# Patient Record
Sex: MALE | HISPANIC OR LATINO | URBAN - METROPOLITAN AREA
[De-identification: names, ages, dates, MRNs, and addresses within clinical notes are randomized per-mention and may not be internally consistent; named-entity substitution may affect disease eponyms.]

---

## 2019-03-28 ENCOUNTER — NEW PATIENT (OUTPATIENT)
Dept: URBAN - METROPOLITAN AREA CLINIC 24 | Facility: CLINIC | Age: 64
End: 2019-03-28
Payer: COMMERCIAL

## 2019-03-28 DIAGNOSIS — E11.9 TYPE 2 DIABETES MELLITUS WITHOUT COMPLICATIONS: Primary | ICD-10-CM

## 2019-03-28 PROCEDURE — 92134 CPTRZ OPH DX IMG PST SGM RTA: CPT | Performed by: OPTOMETRIST

## 2019-03-28 PROCEDURE — 92004 COMPRE OPH EXAM NEW PT 1/>: CPT | Performed by: OPTOMETRIST

## 2019-03-28 ASSESSMENT — KERATOMETRY
OD: 41.11
OS: 40.88

## 2019-03-28 ASSESSMENT — INTRAOCULAR PRESSURE
OD: 16
OS: 15

## 2019-03-28 ASSESSMENT — VISUAL ACUITY
OD: 20/20
OS: 20/20

## 2020-02-21 ENCOUNTER — OFFICE VISIT (OUTPATIENT)
Dept: URBAN - METROPOLITAN AREA CLINIC 17 | Facility: CLINIC | Age: 65
End: 2020-02-21
Payer: MEDICARE

## 2020-02-21 DIAGNOSIS — H11.30 SUBCONJUNCTIVAL HEMORRHAGE: Primary | ICD-10-CM

## 2020-02-21 PROCEDURE — 99203 OFFICE O/P NEW LOW 30 MIN: CPT | Performed by: OPTOMETRIST

## 2020-02-21 ASSESSMENT — INTRAOCULAR PRESSURE: OD: 16

## 2020-02-21 NOTE — IMPRESSION/PLAN
Impression: Subconjunctival hemorrhage: H11.30. OD. Plan: Pt ed re: condition. Use AT's 2-4x/day x lubrication.   RTC 2-4 weeks x dilated diabetic exam.

## 2020-03-17 ENCOUNTER — OFFICE VISIT (OUTPATIENT)
Dept: URBAN - METROPOLITAN AREA CLINIC 18 | Facility: CLINIC | Age: 65
End: 2020-03-17
Payer: MEDICARE

## 2020-03-17 DIAGNOSIS — H25.13 AGE-RELATED NUCLEAR CATARACT, BILATERAL: ICD-10-CM

## 2020-03-17 DIAGNOSIS — H40.013 OPEN ANGLE WITH BORDERLINE FINDINGS, LOW RISK, BILATERAL: ICD-10-CM

## 2020-03-17 PROCEDURE — 92014 COMPRE OPH EXAM EST PT 1/>: CPT | Performed by: OPTOMETRIST

## 2020-03-17 ASSESSMENT — INTRAOCULAR PRESSURE
OD: 13
OS: 13

## 2020-03-17 ASSESSMENT — KERATOMETRY: OD: 41.13

## 2020-03-17 NOTE — IMPRESSION/PLAN
Impression: Type 2 diabetes mellitus w/o complication: I98.2. OU. Plan: No Non-Proliferative Diabetic Retinopathy, no Diabetic Macular Edema and no Neovascularization of the iris, disc, or elsewhere. Discussed ocular and systemic benefits of blood sugar control. Send notes to PCP. Check annually. RTC 1 year x CEE.

## 2020-03-17 NOTE — IMPRESSION/PLAN
Impression: Open angle with borderline findings, low risk, bilateral: H40.013. OU. Plan: OAG suspect d/t large c/d ratio. IOP normal today. Return for baseline testing 1-3 months , including HVF 24-2, OCT-RNFL and pachymetry. No treatment at this time.

## 2020-05-18 ENCOUNTER — TESTING ONLY (OUTPATIENT)
Dept: URBAN - METROPOLITAN AREA CLINIC 17 | Facility: CLINIC | Age: 65
End: 2020-05-18
Payer: MEDICARE

## 2020-05-18 PROCEDURE — 92083 EXTENDED VISUAL FIELD XM: CPT | Performed by: OPTOMETRIST

## 2020-05-19 ENCOUNTER — OFFICE VISIT (OUTPATIENT)
Dept: URBAN - METROPOLITAN AREA CLINIC 18 | Facility: CLINIC | Age: 65
End: 2020-05-19
Payer: MEDICARE

## 2020-05-19 PROCEDURE — 99213 OFFICE O/P EST LOW 20 MIN: CPT | Performed by: OPTOMETRIST

## 2020-05-19 PROCEDURE — 76514 ECHO EXAM OF EYE THICKNESS: CPT | Performed by: OPTOMETRIST

## 2020-05-19 ASSESSMENT — INTRAOCULAR PRESSURE
OD: 14
OS: 16

## 2020-05-19 NOTE — IMPRESSION/PLAN
Impression: Open angle with borderline findings, low risk, bilateral: H40.013. pachymetry: thin OU
HVF 24-2: OD full field, OS few temporal defects Plan: IOP stable today, low teens. Reviewed testing w/pt. Continue to monitor IOP every 4-6 months. No treatment recommended at this time. RTC 6 months x IOP check, OCT RNFL.

## 2020-12-01 ENCOUNTER — OFFICE VISIT (OUTPATIENT)
Dept: URBAN - METROPOLITAN AREA CLINIC 18 | Facility: CLINIC | Age: 65
End: 2020-12-01
Payer: MEDICARE

## 2020-12-01 PROCEDURE — 99212 OFFICE O/P EST SF 10 MIN: CPT | Performed by: OPTOMETRIST

## 2020-12-01 PROCEDURE — 92133 CPTRZD OPH DX IMG PST SGM ON: CPT | Performed by: OPTOMETRIST

## 2020-12-01 ASSESSMENT — INTRAOCULAR PRESSURE
OD: 14
OD: 11
OS: 9
OS: 11

## 2020-12-01 NOTE — IMPRESSION/PLAN
Impression: Open angle with borderline findings, low risk, bilateral: H40.013. pachymetry: thin OU
OCT:
HVF 24-2: (5/18/20) OD full field, OS few temporal defects Plan: IOP stable today, 11/11. Reviewed testing w/pt. Continue to monitor IOP every 4-6 months. No treatment recommended at this time. 
RTC 4-6 months x complete exam, diabetic eval.

## 2021-04-13 ENCOUNTER — OFFICE VISIT (OUTPATIENT)
Dept: URBAN - METROPOLITAN AREA CLINIC 18 | Facility: CLINIC | Age: 66
End: 2021-04-13
Payer: MEDICARE

## 2021-04-13 DIAGNOSIS — H04.123 DRY EYE SYNDROME OF BILATERAL LACRIMAL GLANDS: ICD-10-CM

## 2021-04-13 PROCEDURE — 92014 COMPRE OPH EXAM EST PT 1/>: CPT | Performed by: OPTOMETRIST

## 2021-04-13 ASSESSMENT — INTRAOCULAR PRESSURE
OS: 13
OD: 14

## 2021-04-13 NOTE — IMPRESSION/PLAN
Impression: Dry eye syndrome of bilateral lacrimal glands: H04.123. Plan: Patients complaints are related to condition. Advised patient of condition. Recommended to use OTC tears when needed to help with blurry vision.

## 2021-04-13 NOTE — IMPRESSION/PLAN
Impression: Type 2 diabetes mellitus w/o complication: O39.8. Plan: Diabetes type II: No signs of neovascularization noted. No macular edema. No background retinopathy. Recommended careful diabetic blood sugar control and discussed benefits.

## 2021-04-13 NOTE — IMPRESSION/PLAN
Impression: Open angle with borderline findings, low risk, bilateral: H40.013. pachymetry: thin OU
OCT:
[facility] 24-2: (5/18/20) OD full field, OS few temporal defects Plan: IOP's stable OU today. No treatment is currently recommended at this time. Will continue to monitor with yearly exams.

## 2022-03-30 ENCOUNTER — OFFICE VISIT (OUTPATIENT)
Dept: URBAN - METROPOLITAN AREA CLINIC 18 | Facility: CLINIC | Age: 67
End: 2022-03-30
Payer: MEDICARE

## 2022-03-30 DIAGNOSIS — H43.393 OTHER VITREOUS OPACITIES, BILATERAL: ICD-10-CM

## 2022-03-30 DIAGNOSIS — Z79.4 LONG TERM (CURRENT) USE OF INSULIN: ICD-10-CM

## 2022-03-30 PROCEDURE — 99213 OFFICE O/P EST LOW 20 MIN: CPT | Performed by: OPTOMETRIST

## 2022-03-30 ASSESSMENT — INTRAOCULAR PRESSURE
OS: 13
OD: 13

## 2022-03-30 NOTE — IMPRESSION/PLAN
Impression: Open angle with borderline findings, low risk, bilateral: H40.013. Plan: Higher CD ratio, but cupping is very shallow. Will continue monitoring annually.

## 2022-03-30 NOTE — IMPRESSION/PLAN
Impression: Other vitreous opacities, bilateral: H43.393. Plan: Discussed diagnosis in detail with patient. No treatment is required at this time. Will continue to observe condition and or symptoms. Patient instructed to call if condition gets worse.  Order Refraction per patient request.

## 2022-03-30 NOTE — IMPRESSION/PLAN
Impression: Type 2 diabetes mellitus w/o complication: B04.2. Bilateral. Plan: Diabetes type II: no background retinopathy, no signs of neovascularization noted. Discussed ocular and systemic benefits of blood sugar control.  Return to clinic with Dr. Nkechi Brunson in one year for a diabetic eye exam.

## 2022-03-30 NOTE — IMPRESSION/PLAN
Impression: Long term (current) use of insulin: Z79.4. Plan: Diabetes type II: no background retinopathy, no signs of neovascularization noted. Discussed ocular and systemic benefits of blood sugar control.  Return to clinic with Dr. Justa Garcia in one year for a diabetic eye exam.

## 2023-01-31 ENCOUNTER — OFFICE VISIT (OUTPATIENT)
Dept: URBAN - METROPOLITAN AREA CLINIC 18 | Facility: CLINIC | Age: 68
End: 2023-01-31
Payer: MEDICARE

## 2023-01-31 DIAGNOSIS — H25.13 AGE-RELATED NUCLEAR CATARACT, BILATERAL: ICD-10-CM

## 2023-01-31 DIAGNOSIS — H53.2 DIPLOPIA: Primary | ICD-10-CM

## 2023-01-31 PROCEDURE — 99213 OFFICE O/P EST LOW 20 MIN: CPT | Performed by: OPTOMETRIST

## 2023-01-31 ASSESSMENT — INTRAOCULAR PRESSURE
OS: 11
OD: 12

## 2023-01-31 NOTE — IMPRESSION/PLAN
Impression: Diplopia: H53.2. Left. Plan: Recommend Neurology consult to r/o TIA or cardiovascular event. Pupils did not dilate evenly with Tropicamide and Phenylephrine. Esophoria resolved at 14 pd out. Overall eye health is good. No signs of DM retinopathy.